# Patient Record
(demographics unavailable — no encounter records)

---

## 2025-03-20 NOTE — PHYSICAL EXAM
[Alert] : alert [Well Nourished] : well nourished [No Acute Distress] : no acute distress [Well Developed] : well developed [Normal Sclera/Conjunctiva] : normal sclera/conjunctiva [EOMI] : extra ocular movement intact [No Proptosis] : no proptosis [Normal Oropharynx] : the oropharynx was normal [No Respiratory Distress] : no respiratory distress [No Accessory Muscle Use] : no accessory muscle use [Clear to Auscultation] : lungs were clear to auscultation bilaterally [Normal S1, S2] : normal S1 and S2 [Normal Rate] : heart rate was normal [Regular Rhythm] : with a regular rhythm [No Edema] : no peripheral edema [Pedal Pulses Normal] : the pedal pulses are present [Normal Bowel Sounds] : normal bowel sounds [Not Tender] : non-tender [Not Distended] : not distended [Soft] : abdomen soft [Normal Anterior Cervical Nodes] : no anterior cervical lymphadenopathy [No Spinal Tenderness] : no spinal tenderness [Spine Straight] : spine straight [No Stigmata of Cushings Syndrome] : no stigmata of Cushings Syndrome [Normal Gait] : normal gait [Normal Strength/Tone] : muscle strength and tone were normal [No Rash] : no rash [Normal Reflexes] : deep tendon reflexes were 2+ and symmetric [No Tremors] : no tremors [Oriented x3] : oriented to person, place, and time [Acanthosis Nigricans] : no acanthosis nigricans [de-identified] : heterogeneous bilaterally

## 2025-03-20 NOTE — ASSESSMENT
[FreeTextEntry1] : The patient has a history of hypothyroidism though currently is clinically euthyroid with no hypothyroid or hyperthyroid signs or symptoms. The patient's TSH was elevated at 23.28 while free T4 and free T3 were in the normal range. Risks of subclinical hyperthyroidism (BMD,cardio etc.) and hypothyroidism (fatigue, muscle aches, weight gain etc.) discussed in detail and given clinical euthyroid state after discussion pt. is comfortable with current dose of levothyroxine at 125 mcg.but strong discussion of compliance and consideration of levothyroxine 137. Pt. has multinodular goiter. We have reviewed ultrasound (12/15/2023) and discussed the potential for hyperfunction and need to monitor for changes that could be consistent with neoplasm. Both nodules < 10mm and not suspicious (R 3 mm; L 8mm). Will continue to monitor U/S for stability and function. Lipid levels were reviewed with patient and importance and function of LDL, HDL and triglycerides discussed. Methods to increase HDL (exercise, fish, beans, oatmeal, legumes etc.) discussed with pt. in conjunction with measures to decrease LDL and triglycerides including diet and exercise. The previous LDL/HDL was 41/53 from 20/49, 19/57, 81/41,67/47 & 92/44. Current regimen of treatment (Atorvastatin 80 mg) to continue. Risks of statins were discussed in detail.  Pt. has Impaired fasting glucose (109 -> 104 ->116-> 106-> 110) and HbA1c went from 5.8=> 5.5=> 5.5=> 5.5.-> 5.4=> 5.8=> 5.9=> 6.2 We have discussed diet/exercise and risks related to diabetes in great detail.  The previous PSA was 3.52 from 3.2., 1.2 & 1.9 with previous low free PSA at 22 from 25 & 16. F/u with PMD and  discussed.  Previous Test was normal at 320 and free at 50.2

## 2025-03-20 NOTE — PHYSICAL EXAM
[Alert] : alert [Well Nourished] : well nourished [No Acute Distress] : no acute distress [Well Developed] : well developed [Normal Sclera/Conjunctiva] : normal sclera/conjunctiva [EOMI] : extra ocular movement intact [No Proptosis] : no proptosis [Normal Oropharynx] : the oropharynx was normal [No Respiratory Distress] : no respiratory distress [No Accessory Muscle Use] : no accessory muscle use [Clear to Auscultation] : lungs were clear to auscultation bilaterally [Normal S1, S2] : normal S1 and S2 [Normal Rate] : heart rate was normal [Regular Rhythm] : with a regular rhythm [No Edema] : no peripheral edema [Pedal Pulses Normal] : the pedal pulses are present [Normal Bowel Sounds] : normal bowel sounds [Not Tender] : non-tender [Not Distended] : not distended [Soft] : abdomen soft [Normal Anterior Cervical Nodes] : no anterior cervical lymphadenopathy [No Spinal Tenderness] : no spinal tenderness [Spine Straight] : spine straight [No Stigmata of Cushings Syndrome] : no stigmata of Cushings Syndrome [Normal Gait] : normal gait [Normal Strength/Tone] : muscle strength and tone were normal [No Rash] : no rash [Normal Reflexes] : deep tendon reflexes were 2+ and symmetric [No Tremors] : no tremors [Oriented x3] : oriented to person, place, and time [Acanthosis Nigricans] : no acanthosis nigricans [de-identified] : heterogeneous bilaterally